# Patient Record
Sex: FEMALE | Race: WHITE
[De-identification: names, ages, dates, MRNs, and addresses within clinical notes are randomized per-mention and may not be internally consistent; named-entity substitution may affect disease eponyms.]

---

## 2019-10-30 ENCOUNTER — HOSPITAL ENCOUNTER (EMERGENCY)
Dept: HOSPITAL 56 - MW.ED | Age: 59
Discharge: HOME | End: 2019-10-30
Payer: MEDICAID

## 2019-10-30 DIAGNOSIS — Z88.2: ICD-10-CM

## 2019-10-30 DIAGNOSIS — Z79.82: ICD-10-CM

## 2019-10-30 DIAGNOSIS — Z88.0: ICD-10-CM

## 2019-10-30 DIAGNOSIS — F41.9: ICD-10-CM

## 2019-10-30 DIAGNOSIS — Z79.899: ICD-10-CM

## 2019-10-30 DIAGNOSIS — F17.210: ICD-10-CM

## 2019-10-30 DIAGNOSIS — Z88.8: ICD-10-CM

## 2019-10-30 DIAGNOSIS — I10: ICD-10-CM

## 2019-10-30 DIAGNOSIS — N39.0: Primary | ICD-10-CM

## 2019-10-30 NOTE — EDM.PDOC
ED HPI GENERAL MEDICAL PROBLEM





- General


Chief Complaint: Genitourinary Problem


Stated Complaint: UTI


Time Seen by Provider: 10/30/19 09:00





- History of Present Illness


INITIAL COMMENTS - FREE TEXT/NARRATIVE: 


HISTORY AND PHYSICAL:





History of present illness:


Patient is a 59-year-old white female presents with concern of frequency and 

discomfort urination 5 days this has been unresponsive to over-the-counter 

urinary analgesics she denies fever chills nausea vomiting or back pain





Review of systems: 


As per history of present illness and below otherwise all systems reviewed and 

negative.





Past medical history: 


As per history of present illness and as reviewed below otherwise 

noncontributory.





Surgical history: 


As per history of present illness and as reviewed below otherwise 

noncontributory.





Social history: 


No reported history of drug or alcohol abuse.





Family history: 


As per history of present illness and as reviewed below otherwise 

noncontributory.





Physical exam:


HEENT: Atraumatic, normocephalic, pupils reactive, negative for conjunctival 

pallor or scleral icterus, mucous membranes moist, throat clear, neck supple, 

nontender, trachea midline.


Lungs: Clear to auscultation, breath sounds equal bilaterally, chest nontender.


Heart: S1S2, regular, negative for clicks, rubs, or JVD.


Abdomen: Soft, nondistended, nontender. Negative for masses or 

hepatosplenomegaly. Negative for costovertebral tenderness.


Pelvis: Stable nontender.


Genitourinary: Deferred.


Rectal: Deferred.


Extremities: Atraumatic, negative for cords or calf pain. Neurovascular 

unremarkable.


Neuro: Awake, alert, oriented. Cranial nerves II through XII unremarkable. 

Cerebellum unremarkable. Motor and sensory unremarkable throughout. Exam 

nonfocal.





Diagnostics:


UA





Therapeutics:


None








Impression: 


UTI





Definitive disposition and diagnosis as appropriate pending reevaluation and 

review of above.





  ** back/abdomen


Pain Score (Numeric/FACES): 5





- Related Data


 Allergies











Allergy/AdvReac Type Severity Reaction Status Date / Time


 


amoxicillin Allergy  Hives Verified 10/30/19 08:51


 


bupropion [From Wellbutrin] Allergy  Headache Verified 10/30/19 08:51


 


Sulfa (Sulfonamide Allergy  Hives Verified 10/30/19 08:51





Antibiotics)     


 


varenicline [From Chantix] Allergy  Nausea Verified 10/30/19 08:51











Home Meds: 


 Home Meds





ALPRAZolam [Alprazolam] 0.25 mg PO ASDIRECTED 10/30/19 [History]


Aspirin 81 mg PO DAILY 10/30/19 [History]


Cholecalciferol (Vitamin D3) [Vitamin D] 3,000 unit PO DAILY 10/30/19 [History]


Fish Oil/Omega-3 Fatty Acids [Fish Oil 1,000 MG] 1 tab PO DAILY 10/30/19 [

History]


Fluticasone Propionate [Flonase Allergy Relief] 1 spray LAURA ASDIRECTED 10/30/19 

[History]


Hydrocodone/Acetaminophen [Norco 7.5-325 Tablet] 1 tab PO TID 10/30/19 [History]


Loratadine [Claritin] 1 tab PO DAILY 10/30/19 [History]


Metoprolol Succinate [Kapspargo Sprinkle] 100 tab PO DAILY 10/30/19 [History]


Omeprazole 20 mg PO DAILY 10/30/19 [History]


amLODIPine [Norvasc] 10 tab PO DAILY 10/30/19 [History]


atorvaSTATin [Lipitor] 40 tab PO DAILY 10/30/19 [History]


cloNIDine [Catapres-TTS 1] 0.1 mg PO DAILY 10/30/19 [History]











Past Medical History


HEENT History: Reports: None


Cardiovascular History: Reports: Hypertension


Respiratory History: Reports: None


Gastrointestinal History: Reports: None


Genitourinary History: Reports: Other (See Below)


Other Genitourinary History: Renal stenosis


OB/GYN History: Reports: None


Musculoskeletal History: Reports: Back Pain, Chronic


Neurological History: Reports: None


Psychiatric History: Reports: Anxiety


Endocrine/Metabolic History: Reports: None


Hematologic History: Reports: None


Immunologic History: Reports: None


Oncologic (Cancer) History: Reports: None


Dermatologic History: Reports: None





- Infectious Disease History


Infectious Disease History: Reports: Hepatitis A





- Past Surgical History


Head Surgeries/Procedures: Reports: None


HEENT Surgical History: Reports: None


Cardiovascular Surgical History: Reports: None


Respiratory Surgical History: Reports: None


GI Surgical History: Reports: None


Female  Surgical History: Reports: None


Endocrine Surgical History: Reports: None


Neurological Surgical History: Reports: None


Musculoskeletal Surgical History: Reports: None


Oncologic Surgical History: Reports: None


Dermatological Surgical History: Reports: None





Social & Family History





- Family History


Family Medical History: Noncontributory





- Tobacco Use


Smoking Status *Q: Current Every Day Smoker


Years of Tobacco use: 40


Packs/Tins Daily: 1





- Caffeine Use


Caffeine Use: Reports: Coffee





- Recreational Drug Use


Recreational Drug Use: No





ED ROS GENERAL





- Review of Systems


Review Of Systems: ROS reveals no pertinent complaints other than HPI.





ED EXAM, GENERAL





- Physical Exam


Exam: See Below (dictation)





Course





- Vital Signs


Last Recorded V/S: 





 Last Vital Signs











Temp  35.8 C   10/30/19 08:48


 


Pulse  74   10/30/19 08:48


 


Resp  18   10/30/19 08:48


 


BP  171/83 H  10/30/19 08:48


 


Pulse Ox  96   10/30/19 08:48














- Orders/Labs/Meds


Orders: 





 Active Orders 24 hr











 Category Date Time Status


 


 UA RFX MT AND CULT IF INDIC [URIN] Stat Lab  10/30/19 09:05 Ordered














Departure





- Departure


Time of Disposition: 09:15


Disposition: Home, Self-Care 01


Condition: Good


Clinical Impression: 


 UTI, Urinary tract infectious disease








- Discharge Information


Referrals: 


PCP,Not In Area [Primary Care Provider] - 


Additional Instructions: 


The following information is given to patients seen in the emergency department 

who are being discharged to home. This information is to outline your options 

for follow-up care. We provide all patients seen in our emergency department 

with a follow-up referral.





The need for follow-up, as well as the timing and circumstances, are variable 

depending upon the specifics of your emergency department visit.





If you don't have a primary care physician on staff, we will provide you with a 

referral. We always advise you to contact your personal physician following an 

emergency department visit to inform them of the circumstance of the visit and 

for follow-up with them and/or the need for any referrals to a consulting 

specialist.





The emergency department will also refer you to a specialist when appropriate. 

This referral assures that you have the opportunity for followup care with a 

specialist. All of these measure are taken in an effort to provide you with 

optimal care, which includes your followup.





Under all circumstances we always encourage you to contact your private 

physician who remains a resource for coordinating  your care. When calling for 

followup care, please make the office aware that this follow-up is from your 

recent emergency room visit. If for any reason you are refused follow-up, 

please contact the [facility] emergency department at [phone] 

and asked to speak to the emergency department charge nurse.




















Cipro as prescribed continue urinary analgesic over-the-counter as directed 

follow-up primary medical doctor return as needed as discussed





- My Orders


Last 24 Hours: 





My Active Orders





10/30/19 09:05


UA RFX MT AND CULT IF INDIC [URIN] Stat 














- Assessment/Plan


Last 24 Hours: 





My Active Orders





10/30/19 09:05


UA RFX MT AND CULT IF INDIC [URIN] Stat